# Patient Record
Sex: FEMALE | Race: WHITE
[De-identification: names, ages, dates, MRNs, and addresses within clinical notes are randomized per-mention and may not be internally consistent; named-entity substitution may affect disease eponyms.]

---

## 2021-02-08 NOTE — EDM.PDOC
ED HPI GENERAL MEDICAL PROBLEM





- General


Chief Complaint: Abdominal Pain


Stated Complaint: ER visit


Time Seen by Provider: 02/08/21 07:00


Source of Information: Reports: Patient


History Limitations: Reports: No Limitations





- History of Present Illness


INITIAL COMMENTS - FREE TEXT/NARRATIVE: 





Patient presents the ER with ongoing left sided abdominal pain mostly 

concentrated over the left lower quadrant patient states it was intermittent 

pain that started Saturday and progressively has gotten worse to this morning 

which she rates her pain a 9 out of 10 sharp and stabbing pointing down in the 

left lower quadrant.  She states the pain at first was lasting a few minutes at 

a time and then would go away now she states it is constant.  She says she has 

had pain like this before with prior urinary tract infections but nothing at 

this level the last one is been over a year ago.





She says over the last couple days she has had several episodes of loose bowel 

movements but no diarrhea no nausea or vomiting no fever no rectal bleeding.  

She does have a history of diverticulosis but no diverticulitis.





She has no other complaints at this time


Duration: Day(s):


Location: Reports: Abdomen


Severity: Severe


Improves with: Reports: None


Worsens with: Reports: None


Associated Symptoms: Reports: No Other Symptoms


  ** Left Lower Abdomen


Pain Score (Numeric/FACES): 9





- Related Data


                                    Allergies











Allergy/AdvReac Type Severity Reaction Status Date / Time


 


nitrofurantoin AdvReac  Nausea and Verified 09/04/15 11:05





[From Macrobid]   Vomiting  


 


nitrofurantoin AdvReac  Nausea and Verified 09/04/15 11:05





macrocrystalline   Vomiting  





[From Macrobid]     











Home Meds: 


                                    Home Meds





Escitalopram [Lexapro] 15 mg PO DAILY 01/24/14 [History]


Esomeprazole [NexIUM] 20 mg PO DAILY 01/24/14 [History]


Fluticasone Propionate [Flonase] 2 sprays NASBOTH DAILY 01/24/14 [History]


Loratadine/Pseudoephedrine [Alavert D-12 Allergy-Sinus] 1 tab PO BID 01/24/14 

[History]


Montelukast [Singulair] 10 mg PO DAILY 01/24/14 [History]


amLODIPine [Norvasc] 5 mg PO DAILY 01/24/14 [History]











ED ROS GENERAL





- Review of Systems


Review Of Systems: See Below


Constitutional: Reports: No Symptoms


HEENT: Reports: No Symptoms


Respiratory: Reports: No Symptoms


Cardiovascular: Reports: No Symptoms


Endocrine: Reports: No Symptoms


GI/Abdominal: Reports: Abdominal Pain.  Denies: Anorexia, Black Stool, Bloody 

Stool, Constipation, Diarrhea, Decreased Appetite, Distension, Flatus, 

Hematemesis, Hematochezia, Melena, Mucous in Stool, Nausea, Vomiting


: Reports: Dysuria, Flank Pain, Pain.  Denies: Discharge, Frequency, 

Hematuria, Incontinence, Urgency, Urinary Retention


Musculoskeletal: Reports: No Symptoms


Skin: Reports: No Symptoms


Neurological: Reports: No Symptoms


Psychiatric: Reports: No Symptoms


Hematologic/Lymphatic: Reports: No Symptoms


Immunologic: Reports: No Symptoms





ED EXAM, GI/ABD





- Physical Exam


Exam: See Below


Exam Limited By: No Limitations


General Appearance: Alert, WD/WN, Other (Patient looks to be in moderate 

discomfort looks like she does not feel well she is alert and oriented x4 and 

follows all commands)


Eyes: Bilateral: Normal Appearance, EOMI


Nose: Normal Inspection, Normal Mucosa, No Blood


Throat/Mouth: Normal Inspection, Normal Lips, Normal Teeth, Normal Gums, Normal 

Oropharynx, Normal Voice, No Airway Compromise


Neck: Normal Inspection, Supple, Non-Tender, Full Range of Motion


Respiratory/Chest: No Respiratory Distress, Lungs Clear, Normal Breath Sounds, 

No Accessory Muscle Use, Chest Non-Tender


Cardiovascular: Normal Peripheral Pulses, Regular Rate, Rhythm, No Edema, No 

Gallop, No JVD, No Murmur, No Rub


GI/Abdominal Exam: Normal Bowel Sounds, Soft, No Organomegaly, No Distention, No

 Abnormal Bruit, No Mass, Tender, Other (Patient does have moderate tenderness 

to palpation across the abdomen more so in the left lower quadrant she does not 

have any rebound pain noted negative obturators negative psoas patient states 

she has mild discomfort noted with pelvic rock negative heel slap).  No: Non-

Tender, Guarding, Rigid, Rebound


Back Exam: Normal Inspection, Full Range of Motion.  No: CVA Tenderness (L), CVA

 Tenderness (R)


Extremities: Normal Inspection, Normal Range of Motion, Non-Tender, No Pedal 

Edema


Neurological: Alert, Oriented, CN II-XII Intact, Normal Cognition


Psychiatric: Normal Affect, Normal Mood


Skin Exam: Warm, Dry, Intact, Normal Color, No Rash





Course





- Vital Signs


Text/Narrative:: 





Urinalysis negative leukocytes negative nitrites positive ketones we will check 

CBC BMP





Morphine 2 mg IV Zofran 4 mg IV secondary to the patient's abdominal exam will 

consider CT scan with suspected diverticulitis possible perforation








wbc 20K 





CT Diverticulosis diverticulitis with a partial walled off perforation involving

 the sigmoid colon questionable early developing abscess





Zosyn IV Demerol 25 mg IV Zofran 4 mg IV








[facility] consult [id number] with Dr. Coley states he agrees he needs 

surgical consultation he is good with coverage of Zosyn have patient sent to the

 hospital he agrees to accept 1005


Last Recorded V/S: 


                                Last Vital Signs











Temp  37.7 C   02/08/21 09:58


 


Pulse  97   02/08/21 09:58


 


Resp  16   02/08/21 09:58


 


BP  163/93 H  02/08/21 09:58


 


Pulse Ox  98   02/08/21 09:58














- Orders/Labs/Meds


Labs: 


                                Laboratory Tests











  02/08/21 02/08/21 02/08/21 Range/Units





  07:10 07:46 07:46 


 


WBC   20.6 H*   (4.0-10.0)  x10^3/uL


 


RBC   4.66   (4.00-5.50)  x10^6/uL


 


Hgb   13.5   (12.0-16.0)  g/dL


 


Hct   41.4   (33.0-47.0)  %


 


MCV   88.8   (78.0-93.0)  fL


 


MCH   29.0   (26.0-32.0)  pg


 


MCHC   32.6   (32.0-36.0)  g/dL


 


RDW Coeff of Da   14.4   (10.0-15.0)  %


 


Plt Count   292   (130-400)  x10^3/uL


 


Add Manual Diff   Yes   


 


Neutrophils % (Manual)   87 H   (50-80)  %


 


Lymphocytes % (Manual)   6 L   (25-50)  %


 


Monocytes % (Manual)   7   (2-11)  %


 


Platelet Estimate   Adequate   


 


Sodium    138  (136-145)  mmol/L


 


Potassium    3.5  (3.5-5.1)  mmol/L


 


Chloride    102  ()  mmol/L


 


Carbon Dioxide    26  (21-32)  mmol/L


 


Anion Gap    13.5  (5-15)  mmol/L


 


BUN    11  (7-18)  mg/dL


 


Creatinine    0.7  (0.55-1.02)  mg/dL


 


Est Cr Clr Drug Dosing    78.83  mL/min


 


Estimated GFR (MDRD)    > 60  


 


Glucose    126 H  ()  mg/dL


 


Calcium    9.0  (8.5-10.1)  mg/dL


 


Urine Color  Dark yellow H    (YELLOW)  


 


Urine Appearance  Slightly cloudy H    (CLEAR)  


 


Urine pH  5.5    (5.0-8.0)  


 


Ur Specific Gravity  1.025    


 


Urine Protein  Trace H    (NEGATIVE)  mg/dL


 


Urine Glucose (UA)  Negative    (NEGATIVE)  mg/dL


 


Urine Ketones  15 H    (NEGATIVE)  mg/dL


 


Urine Occult Blood  Small H    (NEGATIVE)  


 


Urine Nitrite  Negative    (NEGATIVE)  


 


Urine Bilirubin  Small H    (NEGATIVE)  


 


Urine Urobilinogen  2.0 H    (0.2)  EU/dL


 


Ur Leukocyte Esterase  Negative    (NEGATIVE)  


 


Urine RBC  5-10 H    (NOT SEEN)  /HPF


 


Urine WBC  0-5    (NOT SEEN)  /HPF


 


Ur Squamous Epith Cells  Few H    (NEGATIVE)  /HPF


 


Urine Bacteria  Occasional H    (NEGATIVE)  /HPF


 


Urine Mucus  Occasional H    (NEGATIVE)  /LPF











Meds: 


Medications














Discontinued Medications














Generic Name Dose Route Start Last Admin





  Trade Name Freq  PRN Reason Stop Dose Admin


 


Sodium Chloride  1,000 mls @ 999 mls/hr  02/08/21 07:59  02/08/21 07:50





  Normal Saline  IV  02/08/21 08:59  999 mls/hr





  ONETIME ONE   Administration


 


Piperacillin Sod/Tazobactam  100 mls @ 200 mls/hr  02/08/21 09:31  02/08/21 

09:54





  Sod 3.375 gm/ Sodium Chloride  IV  02/08/21 10:00  200 mls/hr





  STAT ONE   Administration


 


Iopamidol  100 ml  02/08/21 08:50  02/08/21 08:50





  Isovue-300 (61%)  IVPUSH  02/08/21 08:51  100 ml





  ONETIME ONE   Administration


 


Meperidine HCl  25 mg  02/08/21 09:30  02/08/21 09:55





  Demerol  IV  02/08/21 09:31  25 mg





  ONETIME ONE   Administration


 


Morphine Sulfate  2 mg  02/08/21 07:40  02/08/21 07:50





  Morphine  IVPUSH  02/08/21 07:41  2 mg





  ONETIME ONE   Administration


 


Morphine Sulfate  4 mg  02/08/21 08:52  02/08/21 08:57





  Morphine  IVPUSH  02/08/21 08:53  4 mg





  ONETIME ONE   Administration


 


Ondansetron HCl  4 mg  02/08/21 07:40  02/08/21 07:50





  Zofran  IVPUSH  02/08/21 07:41  4 mg





  ONETIME ONE   Administration


 


Ondansetron HCl  4 mg  02/08/21 09:31  02/08/21 09:55





  Zofran  IVPUSH  02/08/21 09:32  4 mg





  ONETIME ONE   Administration














Departure





- Departure


Time of Disposition: 10:00


Disposition: DC/[facility]fer to Acute [facility]


Condition: Good


Clinical Impression: 


 Abdominal pain, Abdominal abscess








- Discharge Information


*PRESCRIPTION DRUG MONITORING PROGRAM REVIEWED*: No


*COPY OF PRESCRIPTION DRUG MONITORING REPORT IN PATIENT CLIF: No


Referrals: 


Jocelyne Sellers PA-C [Primary Care Provider] - 


Forms:  ED Department Discharge





Sepsis Event Note (ED)





- Evaluation


Sepsis Screening Result: No Definite Risk





- Focused Exam


Vital Signs: 


                                   Vital Signs











  Temp Pulse Resp BP Pulse Ox


 


 02/08/21 09:58  37.7 C  97  16  163/93 H  98


 


 02/08/21 07:00  36.2 C  101 H  16  153/94 H  96














- Problem List & Annotations


(1) Abdominal abscess


SNOMED Code(s): 19207198


   Code(s): YNE7823 -    Status: Acute   Current Visit: Yes   





(2) Abdominal pain


SNOMED Code(s): 87939467


   Code(s): R10.9 - UNSPECIFIED ABDOMINAL PAIN   Status: Acute   Current Visit: 

Yes

## 2021-02-08 NOTE — CT
______________________________________________________________________________   

  

[id number] CT/CT Abdomen Pelvis W IV  

EXAM:   

   

 CT Abdomen Pelvis W IV  

   

 CLINICAL DATA:   

   

 LEFT LOWER QUADRANT PAIN  

   

 COMPARISON:   

   

 No previous similar exam is available.  

   

 FINDINGS:   

   

 There is diverticulosis with diverticulitis in the sigmoid colon  

   

 There is a partially walled off perforation  

   

 There is no bowel obstruction  

   

 The liver and spleen, kidneys and adrenals,   

   

 pancreas and aorta are overall unremarkable  

   

 The gallbladder has been removed  

   

 There is an occasional renal cyst  

   

 There is a 1.5 cm adenoma of the right adrenal  

   

 The appendix is normal  

   

 The appendix is best seen on image 120, series 2.  

   

 The pelvis shows no mass or adenopathy  

   

 There may be an early developing abscess versus   

   

 diverticulum of the bladder on image 146, series 2  

   

 IMPRESSION:  

   

 DIVERTICULOSIS AND DIVERTICULITIS WITH   

   

 A PARTIALLY WALLED OFF PERFORATION   

   

 INVOLVING SIGMOID COLON  

   

 QUESTION OF EARLY DEVELOPING ABSCESS  

   

 SURGICAL CONSULTATION SUGGESTED  

   

 REPORT CALLED  

   

 Electronically signed by Manuel King MD on 2/8/2021 9:23 AM  

   

  

Manuel King MD                 

 02/08/21 0926    

  

Thank you for allowing us to participate in the care of your patient.

## 2023-02-18 ENCOUNTER — HOSPITAL ENCOUNTER (EMERGENCY)
Dept: HOSPITAL 50 - VM.ED | Age: 63
LOS: 1 days | Discharge: HOME | End: 2023-02-19
Payer: COMMERCIAL

## 2023-02-18 VITALS — HEART RATE: 92 BPM

## 2023-02-18 DIAGNOSIS — I44.7: ICD-10-CM

## 2023-02-18 DIAGNOSIS — Z79.899: ICD-10-CM

## 2023-02-18 DIAGNOSIS — Z88.1: ICD-10-CM

## 2023-02-18 DIAGNOSIS — Z88.8: ICD-10-CM

## 2023-02-18 DIAGNOSIS — G45.9: Primary | ICD-10-CM

## 2023-02-18 DIAGNOSIS — I10: ICD-10-CM

## 2023-02-18 LAB
ANION GAP SERPL CALC-SCNC: 14.4 MMOL/L (ref 5–15)
APTT PPP: 25.3 SEC (ref 20.5–30.9)
CHLORIDE SERPL-SCNC: 103 MMOL/L (ref 98–107)
EGFRCR SERPLBLD CKD-EPI 2021: 99 ML/MIN (ref 60–?)
SODIUM SERPL-SCNC: 139 MMOL/L (ref 136–145)

## 2023-02-19 VITALS — SYSTOLIC BLOOD PRESSURE: 134 MMHG | DIASTOLIC BLOOD PRESSURE: 82 MMHG
